# Patient Record
Sex: FEMALE | Race: WHITE | NOT HISPANIC OR LATINO
[De-identification: names, ages, dates, MRNs, and addresses within clinical notes are randomized per-mention and may not be internally consistent; named-entity substitution may affect disease eponyms.]

---

## 2018-02-12 PROBLEM — Z00.00 ENCOUNTER FOR PREVENTIVE HEALTH EXAMINATION: Status: ACTIVE | Noted: 2018-02-12

## 2018-03-01 ENCOUNTER — APPOINTMENT (OUTPATIENT)
Dept: INTERNAL MEDICINE | Facility: CLINIC | Age: 63
End: 2018-03-01

## 2019-04-03 ENCOUNTER — APPOINTMENT (OUTPATIENT)
Dept: OTOLARYNGOLOGY | Facility: CLINIC | Age: 64
End: 2019-04-03

## 2019-04-15 ENCOUNTER — APPOINTMENT (OUTPATIENT)
Dept: OTOLARYNGOLOGY | Facility: CLINIC | Age: 64
End: 2019-04-15
Payer: COMMERCIAL

## 2019-04-15 ENCOUNTER — FORM ENCOUNTER (OUTPATIENT)
Age: 64
End: 2019-04-15

## 2019-04-15 VITALS
TEMPERATURE: 97.9 F | HEART RATE: 81 BPM | DIASTOLIC BLOOD PRESSURE: 76 MMHG | SYSTOLIC BLOOD PRESSURE: 112 MMHG | OXYGEN SATURATION: 95 %

## 2019-04-15 VITALS — BODY MASS INDEX: 18.04 KG/M2 | WEIGHT: 119 LBS | HEIGHT: 68 IN

## 2019-04-15 DIAGNOSIS — Z78.9 OTHER SPECIFIED HEALTH STATUS: ICD-10-CM

## 2019-04-15 DIAGNOSIS — H90.41 SENSORINEURAL HEARING LOSS, UNILATERAL, RIGHT EAR, WITH UNRESTRICTED HEARING ON THE CONTRALATERAL SIDE: ICD-10-CM

## 2019-04-15 DIAGNOSIS — H81.392 OTHER PERIPHERAL VERTIGO, LEFT EAR: ICD-10-CM

## 2019-04-15 DIAGNOSIS — Z81.1 FAMILY HISTORY OF ALCOHOL ABUSE AND DEPENDENCE: ICD-10-CM

## 2019-04-15 PROCEDURE — 99244 OFF/OP CNSLTJ NEW/EST MOD 40: CPT

## 2019-04-16 ENCOUNTER — APPOINTMENT (OUTPATIENT)
Dept: ORTHOPEDIC SURGERY | Facility: CLINIC | Age: 64
End: 2019-04-16
Payer: COMMERCIAL

## 2019-04-16 ENCOUNTER — OUTPATIENT (OUTPATIENT)
Dept: OUTPATIENT SERVICES | Facility: HOSPITAL | Age: 64
LOS: 1 days | End: 2019-04-16
Payer: COMMERCIAL

## 2019-04-16 VITALS
HEIGHT: 68 IN | DIASTOLIC BLOOD PRESSURE: 70 MMHG | OXYGEN SATURATION: 98 % | HEART RATE: 78 BPM | BODY MASS INDEX: 18.04 KG/M2 | SYSTOLIC BLOOD PRESSURE: 110 MMHG | WEIGHT: 119 LBS

## 2019-04-16 DIAGNOSIS — M71.21 SYNOVIAL CYST OF POPLITEAL SPACE [BAKER], RIGHT KNEE: ICD-10-CM

## 2019-04-16 PROBLEM — Z81.1 FAMILY HISTORY OF ALCOHOLISM: Status: ACTIVE | Noted: 2019-04-15

## 2019-04-16 PROBLEM — H81.392 PERIPHERAL VERTIGO INVOLVING LEFT EAR: Status: ACTIVE | Noted: 2019-04-16

## 2019-04-16 PROBLEM — Z78.9 NO PERTINENT PAST SURGICAL HISTORY: Status: RESOLVED | Noted: 2019-04-15 | Resolved: 2019-04-16

## 2019-04-16 PROBLEM — Z78.9 CAFFEINE USE: Status: ACTIVE | Noted: 2019-04-15

## 2019-04-16 PROBLEM — H90.41 ASYMMETRICAL RIGHT SENSORINEURAL HEARING LOSS: Status: ACTIVE | Noted: 2019-04-16

## 2019-04-16 PROBLEM — Z78.9 NO PERTINENT PAST MEDICAL HISTORY: Status: RESOLVED | Noted: 2019-04-15 | Resolved: 2019-04-16

## 2019-04-16 PROBLEM — Z78.9 NON-SMOKER: Status: ACTIVE | Noted: 2019-04-15

## 2019-04-16 LAB
ALBUMIN SERPL ELPH-MCNC: 4.4 G/DL
ALP BLD-CCNC: 47 U/L
ALT SERPL-CCNC: 16 U/L
ANION GAP SERPL CALC-SCNC: 13 MMOL/L
AST SERPL-CCNC: 23 U/L
BASOPHILS # BLD AUTO: 0.04 K/UL
BASOPHILS NFR BLD AUTO: 0.5 %
BILIRUB SERPL-MCNC: 0.3 MG/DL
BUN SERPL-MCNC: 23 MG/DL
CALCIUM SERPL-MCNC: 9.6 MG/DL
CHLORIDE SERPL-SCNC: 105 MMOL/L
CO2 SERPL-SCNC: 24 MMOL/L
CREAT SERPL-MCNC: 0.88 MG/DL
EOSINOPHIL # BLD AUTO: 0.04 K/UL
EOSINOPHIL NFR BLD AUTO: 0.5 %
ERYTHROCYTE [SEDIMENTATION RATE] IN BLOOD BY WESTERGREN METHOD: 3 MM/HR
GLUCOSE SERPL-MCNC: 90 MG/DL
HCT VFR BLD CALC: 40.5 %
HGB BLD-MCNC: 12.9 G/DL
IMM GRANULOCYTES NFR BLD AUTO: 0.1 %
LYMPHOCYTES # BLD AUTO: 1.51 K/UL
LYMPHOCYTES NFR BLD AUTO: 19 %
MAN DIFF?: NORMAL
MCHC RBC-ENTMCNC: 31.5 PG
MCHC RBC-ENTMCNC: 31.9 GM/DL
MCV RBC AUTO: 98.8 FL
MONOCYTES # BLD AUTO: 0.61 K/UL
MONOCYTES NFR BLD AUTO: 7.7 %
NEUTROPHILS # BLD AUTO: 5.74 K/UL
NEUTROPHILS NFR BLD AUTO: 72.2 %
PLATELET # BLD AUTO: 214 K/UL
POTASSIUM SERPL-SCNC: 4.5 MMOL/L
PROT SERPL-MCNC: 6.8 G/DL
RBC # BLD: 4.1 M/UL
RBC # FLD: 13.3 %
RHEUMATOID FACT SER QL: <10 IU/ML
SODIUM SERPL-SCNC: 142 MMOL/L
T PALLIDUM AB SER DONR QL IF: NONREACTIVE
T3 SERPL-MCNC: 82 NG/DL
T4 SERPL-MCNC: 6.8 UG/DL
TSH SERPL-ACNC: 1.72 UIU/ML
WBC # FLD AUTO: 7.95 K/UL

## 2019-04-16 PROCEDURE — 73564 X-RAY EXAM KNEE 4 OR MORE: CPT

## 2019-04-16 PROCEDURE — 99203 OFFICE O/P NEW LOW 30 MIN: CPT

## 2019-04-16 PROCEDURE — 73564 X-RAY EXAM KNEE 4 OR MORE: CPT | Mod: 26,50

## 2019-04-17 LAB
B BURGDOR AB SER-IMP: NEGATIVE
B BURGDOR IGG+IGM SER QL: 0.07 INDEX

## 2019-04-18 LAB — DSDNA AB SER-ACNC: 34 IU/ML

## 2019-04-18 NOTE — DATA REVIEWED
[de-identified] : r rubin sheffield; l darlenew [de-identified] : 1 mm possible AN located in l iac near the fundus

## 2019-04-18 NOTE — PHYSICAL EXAM
[Midline] : trachea located in midline position [Normal] : no rashes [de-identified] : gait steady

## 2019-04-18 NOTE — HISTORY OF PRESENT ILLNESS
[de-identified] : 62 yo woman with 2 yrs of progressive disequilibrium, loss of balance, dizziness, veering when she walks. In addition she has r high pitched continuous moderate tinnitus. no inciting event. She has had an extensive workup and brought audiogram, vng and mri with her to the appt. She has not had sx in the past. No significant noise exposure. nonsmoker.

## 2019-04-18 NOTE — REASON FOR VISIT
[Initial Evaluation] : an initial evaluation for [FreeTextEntry2] : disequilibrium, r tinnitus, l AN

## 2019-04-24 NOTE — HISTORY OF PRESENT ILLNESS
[de-identified] : Hannah Horne is a 62 yo woman with ongoing posterior knee tightness and swelling over the last few months. She developed significant posterior knee swelling after taking a long flight. The swelling fluctuates in size based on her level of activity. It is typically better in the morning and gets progressively worse throughout the day. She has limited flexion. She has some discomfort on stairs. She has modified her activities with mild improvement. She denies any significant pain. Her knee feels stiff and achy. She denies any locking or buckling.

## 2019-04-24 NOTE — PHYSICAL EXAM
[de-identified] : The patient is a well developed, well nourished female in no apparent distress. She is alert and oriented X 3 with a pleasant mood and appropriate affect. \par \par On physical examination of the right knee,her ROM is 0-120 degrees The patient walks with a normal gait and stands in neutral alignment. There is trace effusion. There is a large, palpable Bakers cyst noted. No warmth or erythema is noted. The patella is non tender to palpation medially or laterally. There is no crepitus noted. The apprehension and grind tests are negative. The extensor mechanism is intact. There is medial joint line tenderness. The Chance sign is absent The Lachman and pivot shift tests are negative. There is no varus or valgus laxity at 0 or 30 degrees. No posterolateral or anteromedial laxity is noted. No masses are palpable. No other soft tissue or bony tenderness is noted. Quadriceps weakness is noted. Neurovascular function is intact.   [de-identified] : Radiographs show mild medial joint space narrowing of the right knee.

## 2019-04-24 NOTE — END OF VISIT
[FreeTextEntry3] : All medical record entries made by NIDHI Samaniego, acting as a scribe for this encounter under the direction of Holland Crowe MD . I have reviewed the chart and agree that the record accurately reflects my personal performance of the history, physical exam, assessment and plan. I have also personally directed, reviewed, and agreed with the chart.

## 2019-04-24 NOTE — DISCUSSION/SUMMARY
[de-identified] : Ms Horne has a large, persistent Bakers cyst in her right knee. She will be sent for an MRI to r.o a medial meniscus tear which may be causing the Bakers cyst. We will call her with the results. All questions were answered. She will call if any issues arise.

## 2019-04-30 LAB — INNER EAR 68KD AB FLD QL: NEGATIVE

## 2019-05-23 ENCOUNTER — APPOINTMENT (OUTPATIENT)
Dept: OTOLARYNGOLOGY | Facility: CLINIC | Age: 64
End: 2019-05-23
Payer: COMMERCIAL

## 2019-05-23 VITALS
DIASTOLIC BLOOD PRESSURE: 67 MMHG | OXYGEN SATURATION: 95 % | HEART RATE: 75 BPM | SYSTOLIC BLOOD PRESSURE: 102 MMHG | TEMPERATURE: 97.6 F

## 2019-05-23 DIAGNOSIS — K21.9 ACUTE LARYNGITIS: ICD-10-CM

## 2019-05-23 DIAGNOSIS — J04.0 ACUTE LARYNGITIS: ICD-10-CM

## 2019-05-23 DIAGNOSIS — R42 DIZZINESS AND GIDDINESS: ICD-10-CM

## 2019-05-23 PROCEDURE — 99214 OFFICE O/P EST MOD 30 MIN: CPT | Mod: 25

## 2019-05-23 PROCEDURE — 31575 DIAGNOSTIC LARYNGOSCOPY: CPT

## 2019-05-23 RX ORDER — RANITIDINE HYDROCHLORIDE 150 MG/1
150 CAPSULE ORAL
Qty: 30 | Refills: 6 | Status: ACTIVE | COMMUNITY
Start: 2019-05-23 | End: 1900-01-01

## 2019-05-23 NOTE — PROCEDURE
[Unable to Cooperate with Mirror] : patient unable to cooperate with mirror [Dysphagia] : dysphagia not clearly evaluated by indirect laryngoscopy [Complicated Symptoms] : complicated symptoms requiring more thorough examination than provided by mirror [Topical Lidocaine] : topical lidocaine [Oxymetazoline HCl] : oxymetazoline HCl [Flexible Endoscope] : examined with the flexible endoscope [Serial Number: ___] : Serial Number: [unfilled] [Normal] : posterior cricoid area had healthy pink mucosa in the interarytenoid area and the esophageal inlet [Interarytenoid Edema] : interarytenoid area edematous [de-identified] : [facility]

## 2019-05-23 NOTE — HISTORY OF PRESENT ILLNESS
[de-identified] : 64 yo woman with dizziness is getting v rehab and feeling improved but notg resolved. she has 12 mm lesion on mri  she knows she nmeeds to have followuip on 1 yr from study. SDhe is also c/o cough which is nonproductive an dhas stomach upset. -f/s/c

## 2022-04-11 PROBLEM — J04.0 REFLUX LARYNGITIS: Status: ACTIVE | Noted: 2019-05-23

## 2023-06-09 ENCOUNTER — APPOINTMENT (OUTPATIENT)
Dept: ENDOCRINOLOGY | Facility: CLINIC | Age: 68
End: 2023-06-09

## 2023-09-19 ENCOUNTER — APPOINTMENT (OUTPATIENT)
Dept: ORTHOPEDIC SURGERY | Facility: CLINIC | Age: 68
End: 2023-09-19
Payer: MEDICARE

## 2023-09-19 VITALS — HEIGHT: 68 IN | WEIGHT: 117 LBS | BODY MASS INDEX: 17.73 KG/M2 | RESPIRATION RATE: 16 BRPM

## 2023-09-19 PROCEDURE — 20612 ASPIRATE/INJ GANGLION CYST: CPT | Mod: LT

## 2023-09-19 PROCEDURE — 73110 X-RAY EXAM OF WRIST: CPT | Mod: 50

## 2023-09-19 PROCEDURE — 76882 US LMTD JT/FCL EVL NVASC XTR: CPT | Mod: LT

## 2023-12-18 ENCOUNTER — TRANSCRIPTION ENCOUNTER (OUTPATIENT)
Age: 68
End: 2023-12-18

## 2023-12-18 RX ORDER — CHLORHEXIDINE GLUCONATE 213 G/1000ML
1 SOLUTION TOPICAL DAILY
Refills: 0 | Status: DISCONTINUED | OUTPATIENT
Start: 2023-12-19 | End: 2023-12-19

## 2023-12-18 RX ORDER — ACETAMINOPHEN AND CODEINE PHOSPHATE 300; 30 MG/1; MG/1
300-30 TABLET ORAL
Qty: 10 | Refills: 0 | Status: ACTIVE | COMMUNITY
Start: 2023-12-18 | End: 1900-01-01

## 2023-12-18 NOTE — ASU PATIENT PROFILE, ADULT - NS PREOP UNDERSTANDS INFO
No solid food for 10 hours before surgery/ no lotions/ perfume/ jewelry or make up/ no body piercings/yes

## 2023-12-18 NOTE — ASU PATIENT PROFILE, ADULT - FALL HARM RISK - UNIVERSAL INTERVENTIONS
Bed in lowest position, wheels locked, appropriate side rails in place/Call bell, personal items and telephone in reach/Instruct patient to call for assistance before getting out of bed or chair/Non-slip footwear when patient is out of bed/[place] to call system/Physically safe environment - no spills, clutter or unnecessary equipment/Purposeful Proactive Rounding/Room/bathroom lighting operational, light cord in reach Bed in lowest position, wheels locked, appropriate side rails in place/Call bell, personal items and telephone in reach/Instruct patient to call for assistance before getting out of bed or chair/Non-slip footwear when patient is out of bed/[place] to call system/Physically safe environment - no spills, clutter or unnecessary equipment/Purposeful Proactive Rounding/Room/bathroom lighting operational, light cord in reach

## 2023-12-19 ENCOUNTER — TRANSCRIPTION ENCOUNTER (OUTPATIENT)
Age: 68
End: 2023-12-19

## 2023-12-19 ENCOUNTER — OUTPATIENT (OUTPATIENT)
Dept: OUTPATIENT SERVICES | Facility: HOSPITAL | Age: 68
LOS: 1 days | Discharge: HOME | End: 2023-12-19
Payer: MEDICARE

## 2023-12-19 ENCOUNTER — APPOINTMENT (OUTPATIENT)
Dept: ORTHOPEDIC SURGERY | Facility: AMBULATORY SURGERY CENTER | Age: 68
End: 2023-12-19
Payer: MEDICARE

## 2023-12-19 ENCOUNTER — RESULT REVIEW (OUTPATIENT)
Age: 68
End: 2023-12-19

## 2023-12-19 VITALS
RESPIRATION RATE: 16 BRPM | OXYGEN SATURATION: 97 % | HEIGHT: 68 IN | WEIGHT: 116.18 LBS | DIASTOLIC BLOOD PRESSURE: 61 MMHG | HEART RATE: 79 BPM | TEMPERATURE: 98 F | SYSTOLIC BLOOD PRESSURE: 102 MMHG

## 2023-12-19 VITALS
SYSTOLIC BLOOD PRESSURE: 99 MMHG | HEART RATE: 81 BPM | DIASTOLIC BLOOD PRESSURE: 60 MMHG | OXYGEN SATURATION: 96 % | RESPIRATION RATE: 16 BRPM | TEMPERATURE: 99 F

## 2023-12-19 PROCEDURE — 88304 TISSUE EXAM BY PATHOLOGIST: CPT | Mod: 26

## 2023-12-19 PROCEDURE — 25111 REMOVE WRIST TENDON LESION: CPT | Mod: LT

## 2023-12-19 RX ORDER — ACETAMINOPHEN 500 MG
650 TABLET ORAL ONCE
Refills: 0 | Status: DISCONTINUED | OUTPATIENT
Start: 2023-12-19 | End: 2023-12-19

## 2023-12-19 RX ORDER — ONDANSETRON 8 MG/1
4 TABLET, FILM COATED ORAL ONCE
Refills: 0 | Status: DISCONTINUED | OUTPATIENT
Start: 2023-12-19 | End: 2023-12-19

## 2023-12-19 RX ORDER — OXYCODONE AND ACETAMINOPHEN 5; 325 MG/1; MG/1
1 TABLET ORAL EVERY 4 HOURS
Refills: 0 | Status: DISCONTINUED | OUTPATIENT
Start: 2023-12-19 | End: 2023-12-19

## 2023-12-19 RX ADMIN — CHLORHEXIDINE GLUCONATE 1 APPLICATION(S): 213 SOLUTION TOPICAL at 09:00

## 2023-12-19 NOTE — ASU DISCHARGE PLAN (ADULT/PEDIATRIC) - ASU DC SPECIAL INSTRUCTIONSFT
Co-Directors: Addison Milligan MD; MD Hal Yin MD   [facility] [place] [facility] of [address] Floor 	  [place] [address] 	 	  Phone [phone] (HAND), Fax [phone]    [email or web address]    Hand Surgery Post Operative Instructions      DRESSING CARE:  1.Please keep bandage ON and DRY until you return to the office for your 1st postoperative visit.   2.In the shower you must cover bandage with a plastic bag. You can use tape or a rubber band so no water leaks into the bag.   3.Please do not exercise as that leads to excessive sweating as the bandage will therefore become moist/ wet.    4.Do not remove or change your bandage. You may apply more tape if dressing starts to unravel.   5.Please do not insert any objects, such as a pencil, down into the bandage.     ELEVATION:  1.Keep hand/wrist above heart level at all times or until bandage feels loose. This will help with swelling of the fingers and can be accomplished by using the FOAM PILLOW.   2.A sling will not hold your hand/wrist above your heart and therefore its use should be limited (it may also cause shoulder stiffness).     ACTIVITY:  1.Moving your fingers daily after surgery is very important to prevent stiffness. Please open your fingers completely and close your fingers completely to achieve full range of motion.  **UNLESS TENDON REPAIR OR NERVE REPAIR SURGERY PERFORMED    2.Move all joints of the extremity that are not immobilized to prevent stiffness (i.e. shoulder, elbow, fingers, and thumb unless instructed otherwise).   3.Avoid activities which may re-injure your hand or finger.     DIET:   Regular diet. Start light and progress as tolerated.     PAIN MEDICINE:   1.Pain medicine was sent to your pharmacy.  2.Take pain medicine on an “AS NEEDED” basis according to your doctor’s instructions.   3.Your pain will decrease over the next few days allowing you to:   •Decrease your pain medicine quantity until you stop.   •Increase the time between doses until you stop.   4.You should not drink alcoholic beverages while on pain medication.   5.Take pain medicine with food to prevent nausea.   6.Constipation can occur. If no bowel movement occurs within 48 hours take a laxative of your choice (over the counter).	 	      CONTACT PHYSICIAN FOR:   Slight pain, swelling and bluish discoloration are to be expected. If you have breathing difficulty or chest pain dial 911 immediately. However, if the following symptoms occur notify your physician:   •Temperature above 101° F 	        • Inability to urinate in 8 hours   •Uncontrolled nausea/vomiting   	  • Progressively increasing pain   •Signs of wound infection 	(Redness, swelling, pus-like drainage)                 • Excessive bleeding  • Increasing numbness   •Excessive swelling and tightness 	  • Splint or cast that is too tight      OFFICE APPOINTMENT:    A staff member from the office will call you in the next 1-2 days to schedule your 1st Post Operative appointment to see your physician back in the office. *IF someone does not reach out to you in the next 1-2 days please call the office. Co-Directors: Addison Milligan MD; MD Hal Yin MD   [facility] [place] [facility] of [address] 	  [place] [address] 	 	  Phone [phone] (HAND), Fax [phone]    [email or web address]    Hand Surgery Post Operative Instructions      DRESSING CARE:  1.Please keep bandage ON and DRY until you return to the office for your 1st postoperative visit.   2.In the shower you must cover bandage with a plastic bag. You can use tape or a rubber band so no water leaks into the bag.   3.Please do not exercise as that leads to excessive sweating as the bandage will therefore become moist/ wet.    4.Do not remove or change your bandage. You may apply more tape if dressing starts to unravel.   5.Please do not insert any objects, such as a pencil, down into the bandage.     ELEVATION:  1.Keep hand/wrist above heart level at all times or until bandage feels loose. This will help with swelling of the fingers and can be accomplished by using the FOAM PILLOW.   2.A sling will not hold your hand/wrist above your heart and therefore its use should be limited (it may also cause shoulder stiffness).     ACTIVITY:  1.Moving your fingers daily after surgery is very important to prevent stiffness. Please open your fingers completely and close your fingers completely to achieve full range of motion.  **UNLESS TENDON REPAIR OR NERVE REPAIR SURGERY PERFORMED    2.Move all joints of the extremity that are not immobilized to prevent stiffness (i.e. shoulder, elbow, fingers, and thumb unless instructed otherwise).   3.Avoid activities which may re-injure your hand or finger.     DIET:   Regular diet. Start light and progress as tolerated.     PAIN MEDICINE:   1.Pain medicine was sent to your pharmacy.  2.Take pain medicine on an “AS NEEDED” basis according to your doctor’s instructions.   3.Your pain will decrease over the next few days allowing you to:   •Decrease your pain medicine quantity until you stop.   •Increase the time between doses until you stop.   4.You should not drink alcoholic beverages while on pain medication.   5.Take pain medicine with food to prevent nausea.   6.Constipation can occur. If no bowel movement occurs within 48 hours take a laxative of your choice (over the counter).	 	      CONTACT PHYSICIAN FOR:   Slight pain, swelling and bluish discoloration are to be expected. If you have breathing difficulty or chest pain dial 911 immediately. However, if the following symptoms occur notify your physician:   •Temperature above 101° F 	        • Inability to urinate in 8 hours   •Uncontrolled nausea/vomiting   	  • Progressively increasing pain   •Signs of wound infection 	(Redness, swelling, pus-like drainage)                 • Excessive bleeding  • Increasing numbness   •Excessive swelling and tightness 	  • Splint or cast that is too tight      OFFICE APPOINTMENT:    A staff member from the office will call you in the next 1-2 days to schedule your 1st Post Operative appointment to see your physician back in the office. *IF someone does not reach out to you in the next 1-2 days please call the office.

## 2023-12-19 NOTE — PRE-ANESTHESIA EVALUATION ADULT - NSATTENDATTESTRD_GEN_ALL_CORE
Medicare Wellness Visit patient outreach outcome:  Unable to make appointment: Unable to reach patient      3rd attempt    Nabil [facility] Asst  [phone]    
The patient has been re-examined and I agree with the above assessment or I updated with my findings.

## 2023-12-19 NOTE — ASU DISCHARGE PLAN (ADULT/PEDIATRIC) - CARE PROVIDER_API CALL
Sahil Coughlin  Surgery of the Hand  [address]  [place] [address]  Phone: [phone]  Fax: [phone]  Follow Up Time:    Sahil Coughlin  Surgery of [facility] Hand  [address]  [place] [address]  Phone: [phone]  Fax: [phone]  Follow Up Time:

## 2023-12-19 NOTE — ASU DISCHARGE PLAN (ADULT/PEDIATRIC) - NS MD DC FALL RISK RISK
For information on Fall & Injury Prevention, visit: [email or web address] OR  [email or web address] OR  https://www.cdc.gov/steadi/patient.html For information on Fall & Injury Prevention, visit: [email or web address] OR  [email or web address] OR  https://www.cdc.gov/steadi/patient.html

## 2023-12-19 NOTE — PRE-ANESTHESIA EVALUATION ADULT - MALLAMPATI CLASS
From: Maira Mcdonald  To: Nyla Crowley  Sent: 10/10/2022 3:41 PM CDT  Subject: Mammogram     Good afternoon. I am due for my annual mammogram. Would you please put in an order for me so that I can call and make an appointment.    Than you.    Maira Mcdonald   Class II - visualization of the soft palate, fauces, and uvula

## 2023-12-26 ENCOUNTER — NON-APPOINTMENT (OUTPATIENT)
Age: 68
End: 2023-12-26

## 2023-12-27 LAB
SURGICAL PATHOLOGY STUDY: SIGNIFICANT CHANGE UP
SURGICAL PATHOLOGY STUDY: SIGNIFICANT CHANGE UP

## 2023-12-28 ENCOUNTER — APPOINTMENT (OUTPATIENT)
Dept: ORTHOPEDIC SURGERY | Facility: CLINIC | Age: 68
End: 2023-12-28
Payer: MEDICARE

## 2023-12-28 PROCEDURE — 99024 POSTOP FOLLOW-UP VISIT: CPT

## 2023-12-28 NOTE — HISTORY OF PRESENT ILLNESS
[de-identified] : DOS: 12/19/2023. [de-identified] : 9 days s/p left volar ganglion cyst excision.  Pathology showing ganglion cyst. [de-identified] : Incision healing well, no erythema, no swelling sensation distally intact. [de-identified] : 9 days s/p left volar ganglion cyst excision. [de-identified] : Patient will begin therapy and return in 6 weeks for follow-up.

## 2024-01-18 ENCOUNTER — APPOINTMENT (OUTPATIENT)
Dept: NEUROLOGY | Facility: CLINIC | Age: 69
End: 2024-01-18
Payer: MEDICARE

## 2024-01-18 ENCOUNTER — NON-APPOINTMENT (OUTPATIENT)
Age: 69
End: 2024-01-18

## 2024-01-18 VITALS
OXYGEN SATURATION: 97 % | DIASTOLIC BLOOD PRESSURE: 75 MMHG | SYSTOLIC BLOOD PRESSURE: 109 MMHG | BODY MASS INDEX: 17.73 KG/M2 | WEIGHT: 117 LBS | HEART RATE: 85 BPM | HEIGHT: 68 IN

## 2024-01-18 DIAGNOSIS — Z83.3 FAMILY HISTORY OF DIABETES MELLITUS: ICD-10-CM

## 2024-01-18 DIAGNOSIS — I67.1 CEREBRAL ANEURYSM, NONRUPTURED: ICD-10-CM

## 2024-01-18 DIAGNOSIS — D33.3 BENIGN NEOPLASM OF CRANIAL NERVES: ICD-10-CM

## 2024-01-18 PROCEDURE — 99204 OFFICE O/P NEW MOD 45 MIN: CPT

## 2024-01-18 NOTE — HISTORY OF PRESENT ILLNESS
[FreeTextEntry1] : 68F. 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   She does not have the official report, only a screenshot of the MR image, which looked to be an MRA TOF as she said it was a non-contrast film.  The reason for the scan was a recent pancreatic issue and wanted to be sure.  She recently had a friend have a horrible headache then  in her sleep because of an aneurysm, and was concerned and seeking a neurological opinion.  Endorses ongoing occasional dizziness, she has seen ENT for this in the past.  Denies headaches, visual disturbance (wears glasses), double vision, weakness, or sensory disturbance. Patient exercises regularly - hot yoga/cardio.

## 2024-01-18 NOTE — ASSESSMENT
[FreeTextEntry1] : 68F. PMH: Schwannoma. Here to discuss MR L ELISABETH 2mm infundibulum v.s. aneurysm off the L A2 ELISABETH seen on Prenuvo scan.   Plan:  - MR Brain NonCon, MRA brain w/wo Qamar - Educated on keeping blood pressure controlled during exercise - monitor

## 2024-01-18 NOTE — END OF VISIT
[FreeTextEntry3] : I personally saw and evaluated this patient with Dr. Fofana and agree with the history, exam and plan as outlined in this note.

## 2024-01-18 NOTE — DISCUSSION/SUMMARY
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

## 2024-01-18 NOTE — PHYSICAL EXAM
[FreeTextEntry1] :  PHYSICAL EXAMINATION: General: Well-developed, well nourished, in no acute distress. Eyes: Conjunctiva and sclera clear. Neurologic: - Mental Status:  Alert, awake, oriented to person, place, and time; Speech is fluent with intact  comprehension;  - Cranial Nerves II-XII:   II:  Visual fields are full; Pupils are equal, round, and reactive to light; III, IV, VI:  Extraocular movements are intact without nystagmus. V:  Facial sensation is intact in the V1-V3 distribution bilaterally. VII:  Face is symmetric with normal eye closure and smile VIII:  Hearing is intact to finger rub. XI:  Head turning and shoulder shrug are intact. XII:  Tongue protrudes in the midline. - Motor:  Strength is 5/5 throughout.  There is no prontator drift.  Normal muscle bulk and tone throughout. - Reflexes:  2+ and symmetric at the biceps, triceps, brachioradialis, knees, and ankles.  - Sensory:  Intact throughout to light touch - Coordination:  Finger-nose-finger and heel-knee-shin intact without dysmetria.  - Gait:   Normal steps, base, arm swing, and turning.

## 2024-01-24 NOTE — ASU PATIENT PROFILE, ADULT - ABILITY TO HEAR (WITH HEARING AID OR HEARING APPLIANCE IF NORMALLY USED):
DR FIELD PT    PT PHONED OFFICE AND IS REQUESTING REFILL ON    COLCHICINE    [facility]    PT IS HAVING GOUT FLARE UP   Adequate: hears normal conversation without difficulty

## 2024-02-08 ENCOUNTER — APPOINTMENT (OUTPATIENT)
Dept: ORTHOPEDIC SURGERY | Facility: CLINIC | Age: 69
End: 2024-02-08
Payer: MEDICARE

## 2024-02-08 VITALS — HEIGHT: 68 IN | WEIGHT: 117 LBS | RESPIRATION RATE: 16 BRPM | BODY MASS INDEX: 17.73 KG/M2

## 2024-02-08 DIAGNOSIS — M79.89 OTHER SPECIFIED SOFT TISSUE DISORDERS: ICD-10-CM

## 2024-02-08 PROCEDURE — 99024 POSTOP FOLLOW-UP VISIT: CPT

## 2024-02-08 NOTE — HISTORY OF PRESENT ILLNESS
[de-identified] : DOS: 12/19/2023.  7 weeks, 6 days s/p left volar ganglion cyst excision.  Patient is done with therapy and she's doing well. [de-identified] : Incision well-healed full range of motion neurovascular intact [de-identified] :  7 weeks, 6 days s/p left volar ganglion cyst excision.   [de-identified] : Patient has an outstanding result and can return as needed

## 2024-03-05 ENCOUNTER — OUTPATIENT (OUTPATIENT)
Dept: OUTPATIENT SERVICES | Facility: HOSPITAL | Age: 69
LOS: 1 days | End: 2024-03-05
Payer: MEDICARE

## 2024-03-05 ENCOUNTER — APPOINTMENT (OUTPATIENT)
Dept: MRI IMAGING | Facility: HOSPITAL | Age: 69
End: 2024-03-05
Payer: MEDICARE

## 2024-03-05 PROCEDURE — A9579: CPT

## 2024-03-05 PROCEDURE — 70546 MR ANGIOGRAPH HEAD W/O&W/DYE: CPT

## 2024-03-05 PROCEDURE — 70551 MRI BRAIN STEM W/O DYE: CPT

## 2024-03-05 PROCEDURE — 70546 MR ANGIOGRAPH HEAD W/O&W/DYE: CPT | Mod: 26,MH

## 2024-03-05 PROCEDURE — A9585: CPT

## 2024-03-05 PROCEDURE — 70551 MRI BRAIN STEM W/O DYE: CPT | Mod: 26,MH

## 2024-03-12 DIAGNOSIS — R93.0 ABNORMAL FINDINGS ON DIAGNOSTIC IMAGING OF SKULL AND HEAD, NOT ELSEWHERE CLASSIFIED: ICD-10-CM

## 2024-03-12 DIAGNOSIS — I67.82 CEREBRAL ISCHEMIA: ICD-10-CM

## 2025-03-04 ENCOUNTER — APPOINTMENT (OUTPATIENT)
Dept: CT IMAGING | Facility: HOSPITAL | Age: 70
End: 2025-03-04

## 2025-03-12 ENCOUNTER — APPOINTMENT (OUTPATIENT)
Dept: CT IMAGING | Facility: HOSPITAL | Age: 70
End: 2025-03-12

## (undated) DEVICE — GLV 8 PROTEXIS (WHITE)

## (undated) DEVICE — WARMING BLANKET LOWER ADULT

## (undated) DEVICE — SUT SILK 4-0 18" PS-2

## (undated) DEVICE — TOURNIQUET CUFF 18" DUAL PORT SINGLE BLADDER W PLC  (BLACK)

## (undated) DEVICE — SLV COMPRESSION KNEE MED

## (undated) DEVICE — MARKING PEN W RULER

## (undated) DEVICE — PACK HAND

## (undated) DEVICE — SUT ETHILON 5-0 18" P-3

## (undated) DEVICE — GLV 8.5 PROTEXIS (WHITE)